# Patient Record
Sex: FEMALE | Race: WHITE
[De-identification: names, ages, dates, MRNs, and addresses within clinical notes are randomized per-mention and may not be internally consistent; named-entity substitution may affect disease eponyms.]

---

## 2017-06-19 ENCOUNTER — HOSPITAL ENCOUNTER (EMERGENCY)
Dept: HOSPITAL 58 - ED | Age: 42
Discharge: HOME | End: 2017-06-19

## 2017-06-19 VITALS — BODY MASS INDEX: 36 KG/M2

## 2017-06-19 VITALS — DIASTOLIC BLOOD PRESSURE: 73 MMHG | SYSTOLIC BLOOD PRESSURE: 134 MMHG | TEMPERATURE: 99.4 F

## 2017-06-19 DIAGNOSIS — T23.201A: Primary | ICD-10-CM

## 2017-06-19 DIAGNOSIS — T23.271A: ICD-10-CM
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
## 2017-06-19 PROCEDURE — 99283 EMERGENCY DEPT VISIT LOW MDM: CPT
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
## 2017-12-08 ENCOUNTER — HOSPITAL ENCOUNTER (OUTPATIENT)
Dept: HOSPITAL 58 - RAD | Age: 42
Discharge: HOME | End: 2017-12-08

## 2017-12-08 VITALS — BODY MASS INDEX: 36 KG/M2

## 2017-12-08 DIAGNOSIS — R19.8: ICD-10-CM

## 2017-12-08 DIAGNOSIS — Z83.79: ICD-10-CM

## 2017-12-08 DIAGNOSIS — R19.7: ICD-10-CM

## 2017-12-08 DIAGNOSIS — Z98.890: ICD-10-CM

## 2017-12-08 DIAGNOSIS — R11.2: ICD-10-CM

## 2017-12-08 DIAGNOSIS — Z87.19: ICD-10-CM

## 2017-12-08 DIAGNOSIS — K21.9: ICD-10-CM

## 2017-12-08 DIAGNOSIS — R10.84: ICD-10-CM

## 2017-12-08 DIAGNOSIS — K63.9: Primary | ICD-10-CM

## 2017-12-08 LAB
CREAT SERPL-MCNC: 0.84 MG/DL (ref 0.6–1.3)
GFR SERPLBLD BASED ON 1.73 SQ M-ARVRAT: 74 ML/MIN

## 2017-12-08 PROCEDURE — 36415 COLL VENOUS BLD VENIPUNCTURE: CPT

## 2017-12-08 PROCEDURE — 82565 ASSAY OF CREATININE: CPT

## 2017-12-08 NOTE — CT
EXAM:  CT of the abdomen pelvis with and without contrast 

  

History:  Chronic diarrhea, history of gastric sleeve. 

  

Comparison:  CT abdomen pelvis 02/01/2016 

  

Technique:  Multiplanar CT images through the abdomen pelvis were obtained with and without the admin
istration of IV contrast. 

  

Findings:  Lung bases are free of consolidation.  No acute osseous abnormalities. Severe degenerative
 disc disease at L5-S1. 

  

No renal stones and no hydronephrosis.  Postsurgical changes of the stomach are again noted. No urete
ral calculi. 

  

No gallstones identified by CT.  No focal liver or splenic lesions.  Pancreas is unremarkable.  Adren
al glands are within normal limits.  No renal masses.  No bowel obstruction.  The appendix is not dil
ated or inflamed.  No bladder wall thickening.  Adnexal structures appear appropriate for patient's a
ge.  No free air and no ascites.  Mild to moderate colonic stool.  No lymphadenopathy. 

  

Impression:  No acute intra-abdominal or pelvic process.  Severe degenerative disc disease at L5-S1

## 2017-12-20 ENCOUNTER — HOSPITAL ENCOUNTER (OUTPATIENT)
Dept: HOSPITAL 58 - LAB | Age: 42
Discharge: HOME | End: 2017-12-20

## 2017-12-20 VITALS — BODY MASS INDEX: 36 KG/M2

## 2017-12-20 DIAGNOSIS — R19.7: ICD-10-CM

## 2017-12-20 DIAGNOSIS — R11.2: ICD-10-CM

## 2017-12-20 DIAGNOSIS — K21.9: ICD-10-CM

## 2017-12-20 DIAGNOSIS — K63.9: Primary | ICD-10-CM

## 2017-12-20 DIAGNOSIS — Z98.890: ICD-10-CM

## 2017-12-20 DIAGNOSIS — Z83.79: ICD-10-CM

## 2017-12-20 DIAGNOSIS — Z87.19: ICD-10-CM

## 2017-12-20 DIAGNOSIS — R10.84: ICD-10-CM

## 2017-12-20 DIAGNOSIS — R19.8: ICD-10-CM

## 2017-12-20 PROCEDURE — 87045 FECES CULTURE AEROBIC BACT: CPT

## 2017-12-20 PROCEDURE — 87493 C DIFF AMPLIFIED PROBE: CPT

## 2017-12-20 PROCEDURE — 87899 AGENT NOS ASSAY W/OPTIC: CPT

## 2017-12-20 PROCEDURE — 36415 COLL VENOUS BLD VENIPUNCTURE: CPT

## 2017-12-20 PROCEDURE — 87015 SPECIMEN INFECT AGNT CONCNTJ: CPT

## 2018-03-06 ENCOUNTER — HOSPITAL ENCOUNTER (OUTPATIENT)
Dept: HOSPITAL 58 - LAB | Age: 43
Discharge: HOME | End: 2018-03-06
Attending: FAMILY MEDICINE

## 2018-03-06 VITALS — BODY MASS INDEX: 36 KG/M2

## 2018-03-06 DIAGNOSIS — D68.9: Primary | ICD-10-CM

## 2018-03-06 PROCEDURE — 85246 CLOT FACTOR VIII VW ANTIGEN: CPT

## 2018-03-06 PROCEDURE — 85610 PROTHROMBIN TIME: CPT

## 2018-03-06 PROCEDURE — 85240 CLOT FACTOR VIII AHG 1 STAGE: CPT

## 2018-03-06 PROCEDURE — 83520 IMMUNOASSAY QUANT NOS NONAB: CPT

## 2018-03-06 PROCEDURE — 85027 COMPLETE CBC AUTOMATED: CPT

## 2018-03-06 PROCEDURE — 36415 COLL VENOUS BLD VENIPUNCTURE: CPT

## 2018-03-06 PROCEDURE — 85730 THROMBOPLASTIN TIME PARTIAL: CPT

## 2018-03-06 PROCEDURE — 85245 CLOT FACTOR VIII VW RISTOCTN: CPT
